# Patient Record
Sex: MALE | Race: WHITE | URBAN - METROPOLITAN AREA
[De-identification: names, ages, dates, MRNs, and addresses within clinical notes are randomized per-mention and may not be internally consistent; named-entity substitution may affect disease eponyms.]

---

## 2018-04-21 ENCOUNTER — TELEPHONE (OUTPATIENT)
Dept: OTHER | Facility: CLINIC | Age: 33
End: 2018-04-21

## 2021-03-05 ENCOUNTER — IMMUNIZATION (OUTPATIENT)
Dept: PRIMARY CARE CLINIC | Age: 36
End: 2021-03-05
Payer: COMMERCIAL

## 2021-03-05 PROCEDURE — 91300 COVID-19, PFIZER VACCINE 30MCG/0.3ML DOSE: CPT | Performed by: FAMILY MEDICINE

## 2021-03-05 PROCEDURE — 0001A COVID-19, PFIZER VACCINE 30MCG/0.3ML DOSE: CPT | Performed by: FAMILY MEDICINE

## 2021-03-26 ENCOUNTER — IMMUNIZATION (OUTPATIENT)
Dept: PRIMARY CARE CLINIC | Age: 36
End: 2021-03-26
Payer: COMMERCIAL

## 2021-03-26 PROCEDURE — 91300 COVID-19, PFIZER VACCINE 30MCG/0.3ML DOSE: CPT | Performed by: FAMILY MEDICINE

## 2021-03-26 PROCEDURE — 0002A COVID-19, PFIZER VACCINE 30MCG/0.3ML DOSE: CPT | Performed by: FAMILY MEDICINE

## 2023-09-30 ENCOUNTER — HOSPITAL ENCOUNTER (OUTPATIENT)
Age: 38
Setting detail: OBSERVATION
Discharge: ANOTHER HOSPITAL | End: 2023-10-02
Attending: EMERGENCY MEDICINE | Admitting: INTERNAL MEDICINE
Payer: COMMERCIAL

## 2023-09-30 ENCOUNTER — APPOINTMENT (OUTPATIENT)
Dept: CT IMAGING | Age: 38
End: 2023-09-30
Payer: COMMERCIAL

## 2023-09-30 DIAGNOSIS — Z86.59 HISTORY OF ANXIETY: ICD-10-CM

## 2023-09-30 DIAGNOSIS — R47.9 DIFFICULTY SPEAKING: ICD-10-CM

## 2023-09-30 DIAGNOSIS — R41.82 ALTERED MENTAL STATUS, UNSPECIFIED ALTERED MENTAL STATUS TYPE: Primary | ICD-10-CM

## 2023-09-30 DIAGNOSIS — H53.2 DOUBLE VISION: ICD-10-CM

## 2023-09-30 PROBLEM — R41.0 CONFUSION: Status: ACTIVE | Noted: 2023-09-30

## 2023-09-30 LAB
ALBUMIN SERPL-MCNC: 4.9 G/DL (ref 3.4–5)
ALBUMIN/GLOB SERPL: 1.8 {RATIO} (ref 1.1–2.2)
ALP SERPL-CCNC: 106 U/L (ref 40–129)
ALT SERPL-CCNC: 27 U/L (ref 10–40)
AMMONIA PLAS-SCNC: 39 UMOL/L (ref 16–60)
AMPHETAMINES UR QL SCN>1000 NG/ML: NORMAL
ANION GAP SERPL CALCULATED.3IONS-SCNC: 9 MMOL/L (ref 3–16)
AST SERPL-CCNC: 21 U/L (ref 15–37)
BARBITURATES UR QL SCN>200 NG/ML: NORMAL
BASE EXCESS BLDV CALC-SCNC: -0.5 MMOL/L (ref -3–3)
BASOPHILS # BLD: 0 K/UL (ref 0–0.2)
BASOPHILS NFR BLD: 0.2 %
BENZODIAZ UR QL SCN>200 NG/ML: NORMAL
BILIRUB SERPL-MCNC: 0.5 MG/DL (ref 0–1)
BILIRUB UR QL STRIP.AUTO: NEGATIVE
BUN SERPL-MCNC: 15 MG/DL (ref 7–20)
CALCIUM SERPL-MCNC: 9.5 MG/DL (ref 8.3–10.6)
CANNABINOIDS UR QL SCN>50 NG/ML: NORMAL
CHLORIDE SERPL-SCNC: 101 MMOL/L (ref 99–110)
CLARITY UR: CLEAR
CO2 BLDV-SCNC: 26 MMOL/L
CO2 SERPL-SCNC: 27 MMOL/L (ref 21–32)
COCAINE UR QL SCN: NORMAL
COHGB MFR BLDV: 1.3 % (ref 0–1.5)
COLOR UR: YELLOW
CREAT SERPL-MCNC: 0.8 MG/DL (ref 0.9–1.3)
DEPRECATED RDW RBC AUTO: 14.5 % (ref 12.4–15.4)
DRUG SCREEN COMMENT UR-IMP: NORMAL
EKG ATRIAL RATE: 105 BPM
EKG DIAGNOSIS: NORMAL
EKG P AXIS: 59 DEGREES
EKG P-R INTERVAL: 156 MS
EKG Q-T INTERVAL: 348 MS
EKG QRS DURATION: 84 MS
EKG QTC CALCULATION (BAZETT): 459 MS
EKG R AXIS: 32 DEGREES
EKG T AXIS: 21 DEGREES
EKG VENTRICULAR RATE: 105 BPM
EOSINOPHIL # BLD: 0 K/UL (ref 0–0.6)
EOSINOPHIL NFR BLD: 0.4 %
ETHANOLAMINE SERPL-MCNC: NORMAL MG/DL (ref 0–0.08)
FENTANYL SCREEN, URINE: NORMAL
GFR SERPLBLD CREATININE-BSD FMLA CKD-EPI: >60 ML/MIN/{1.73_M2}
GLUCOSE SERPL-MCNC: 117 MG/DL (ref 70–99)
GLUCOSE UR STRIP.AUTO-MCNC: NEGATIVE MG/DL
HCO3 BLDV-SCNC: 24.6 MMOL/L (ref 23–29)
HCT VFR BLD AUTO: 43 % (ref 40.5–52.5)
HGB BLD-MCNC: 14.7 G/DL (ref 13.5–17.5)
HGB UR QL STRIP.AUTO: NEGATIVE
KETONES UR STRIP.AUTO-MCNC: NEGATIVE MG/DL
LACTATE BLDV-SCNC: 1.2 MMOL/L (ref 0.4–1.9)
LEUKOCYTE ESTERASE UR QL STRIP.AUTO: NEGATIVE
LYMPHOCYTES # BLD: 1.3 K/UL (ref 1–5.1)
LYMPHOCYTES NFR BLD: 18.1 %
MCH RBC QN AUTO: 29.3 PG (ref 26–34)
MCHC RBC AUTO-ENTMCNC: 34.1 G/DL (ref 31–36)
MCV RBC AUTO: 85.9 FL (ref 80–100)
METHADONE UR QL SCN>300 NG/ML: NORMAL
METHGB MFR BLDV: 0.7 %
MONOCYTES # BLD: 0.5 K/UL (ref 0–1.3)
MONOCYTES NFR BLD: 6.9 %
NEUTROPHILS # BLD: 5.5 K/UL (ref 1.7–7.7)
NEUTROPHILS NFR BLD: 74.4 %
NITRITE UR QL STRIP.AUTO: NEGATIVE
O2 THERAPY: ABNORMAL
OPIATES UR QL SCN>300 NG/ML: NORMAL
OXYCODONE UR QL SCN: NORMAL
PCO2 BLDV: 41.8 MMHG (ref 40–50)
PCP UR QL SCN>25 NG/ML: NORMAL
PH BLDV: 7.39 [PH] (ref 7.35–7.45)
PH UR STRIP.AUTO: 7 [PH] (ref 5–8)
PH UR STRIP: 7 [PH]
PLATELET # BLD AUTO: 224 K/UL (ref 135–450)
PMV BLD AUTO: 7.4 FL (ref 5–10.5)
PO2 BLDV: 69.3 MMHG (ref 25–40)
POTASSIUM SERPL-SCNC: 3.8 MMOL/L (ref 3.5–5.1)
PROCALCITONIN SERPL IA-MCNC: 0.03 NG/ML (ref 0–0.15)
PROT SERPL-MCNC: 7.7 G/DL (ref 6.4–8.2)
PROT UR STRIP.AUTO-MCNC: NEGATIVE MG/DL
RBC # BLD AUTO: 5.01 M/UL (ref 4.2–5.9)
SALICYLATES SERPL-MCNC: 1.5 MG/DL (ref 15–30)
SAO2 % BLDV: 93 %
SODIUM SERPL-SCNC: 137 MMOL/L (ref 136–145)
SP GR UR STRIP.AUTO: 1.01 (ref 1–1.03)
UA COMPLETE W REFLEX CULTURE PNL UR: NORMAL
UA DIPSTICK W REFLEX MICRO PNL UR: NORMAL
URN SPEC COLLECT METH UR: NORMAL
UROBILINOGEN UR STRIP-ACNC: 0.2 E.U./DL
WBC # BLD AUTO: 7.4 K/UL (ref 4–11)

## 2023-09-30 PROCEDURE — 82746 ASSAY OF FOLIC ACID SERUM: CPT

## 2023-09-30 PROCEDURE — 80179 DRUG ASSAY SALICYLATE: CPT

## 2023-09-30 PROCEDURE — 85025 COMPLETE CBC W/AUTO DIFF WBC: CPT

## 2023-09-30 PROCEDURE — 82140 ASSAY OF AMMONIA: CPT

## 2023-09-30 PROCEDURE — 6370000000 HC RX 637 (ALT 250 FOR IP): Performed by: EMERGENCY MEDICINE

## 2023-09-30 PROCEDURE — 81003 URINALYSIS AUTO W/O SCOPE: CPT

## 2023-09-30 PROCEDURE — 93005 ELECTROCARDIOGRAM TRACING: CPT | Performed by: EMERGENCY MEDICINE

## 2023-09-30 PROCEDURE — G0378 HOSPITAL OBSERVATION PER HR: HCPCS

## 2023-09-30 PROCEDURE — 83605 ASSAY OF LACTIC ACID: CPT

## 2023-09-30 PROCEDURE — 82077 ASSAY SPEC XCP UR&BREATH IA: CPT

## 2023-09-30 PROCEDURE — 96374 THER/PROPH/DIAG INJ IV PUSH: CPT

## 2023-09-30 PROCEDURE — 6360000002 HC RX W HCPCS: Performed by: INTERNAL MEDICINE

## 2023-09-30 PROCEDURE — 82607 VITAMIN B-12: CPT

## 2023-09-30 PROCEDURE — 96375 TX/PRO/DX INJ NEW DRUG ADDON: CPT

## 2023-09-30 PROCEDURE — 6370000000 HC RX 637 (ALT 250 FOR IP): Performed by: NURSE PRACTITIONER

## 2023-09-30 PROCEDURE — 80307 DRUG TEST PRSMV CHEM ANLYZR: CPT

## 2023-09-30 PROCEDURE — 2580000003 HC RX 258: Performed by: EMERGENCY MEDICINE

## 2023-09-30 PROCEDURE — 82803 BLOOD GASES ANY COMBINATION: CPT

## 2023-09-30 PROCEDURE — 70450 CT HEAD/BRAIN W/O DYE: CPT

## 2023-09-30 PROCEDURE — 6360000002 HC RX W HCPCS: Performed by: EMERGENCY MEDICINE

## 2023-09-30 PROCEDURE — 84443 ASSAY THYROID STIM HORMONE: CPT

## 2023-09-30 PROCEDURE — 36415 COLL VENOUS BLD VENIPUNCTURE: CPT

## 2023-09-30 PROCEDURE — 80053 COMPREHEN METABOLIC PANEL: CPT

## 2023-09-30 PROCEDURE — 93010 ELECTROCARDIOGRAM REPORT: CPT | Performed by: INTERNAL MEDICINE

## 2023-09-30 PROCEDURE — 99285 EMERGENCY DEPT VISIT HI MDM: CPT

## 2023-09-30 PROCEDURE — 84145 PROCALCITONIN (PCT): CPT

## 2023-09-30 PROCEDURE — 2580000003 HC RX 258: Performed by: INTERNAL MEDICINE

## 2023-09-30 RX ORDER — LORAZEPAM 1 MG/1
1 TABLET ORAL ONCE
Status: COMPLETED | OUTPATIENT
Start: 2023-09-30 | End: 2023-09-30

## 2023-09-30 RX ORDER — ONDANSETRON 4 MG/1
4 TABLET, ORALLY DISINTEGRATING ORAL EVERY 8 HOURS PRN
Status: DISCONTINUED | OUTPATIENT
Start: 2023-09-30 | End: 2023-10-02 | Stop reason: HOSPADM

## 2023-09-30 RX ORDER — METOCLOPRAMIDE HYDROCHLORIDE 5 MG/ML
10 INJECTION INTRAMUSCULAR; INTRAVENOUS ONCE
Status: COMPLETED | OUTPATIENT
Start: 2023-09-30 | End: 2023-09-30

## 2023-09-30 RX ORDER — ACETAMINOPHEN 650 MG/1
650 SUPPOSITORY RECTAL EVERY 6 HOURS PRN
Status: DISCONTINUED | OUTPATIENT
Start: 2023-09-30 | End: 2023-10-02 | Stop reason: HOSPADM

## 2023-09-30 RX ORDER — SODIUM CHLORIDE 0.9 % (FLUSH) 0.9 %
5-40 SYRINGE (ML) INJECTION PRN
Status: DISCONTINUED | OUTPATIENT
Start: 2023-09-30 | End: 2023-10-02 | Stop reason: HOSPADM

## 2023-09-30 RX ORDER — QUETIAPINE FUMARATE 25 MG/1
50 TABLET, FILM COATED ORAL
Status: DISCONTINUED | OUTPATIENT
Start: 2023-09-30 | End: 2023-09-30

## 2023-09-30 RX ORDER — DIPHENHYDRAMINE HYDROCHLORIDE 50 MG/ML
50 INJECTION INTRAMUSCULAR; INTRAVENOUS ONCE
Status: COMPLETED | OUTPATIENT
Start: 2023-09-30 | End: 2023-09-30

## 2023-09-30 RX ORDER — ONDANSETRON 2 MG/ML
4 INJECTION INTRAMUSCULAR; INTRAVENOUS EVERY 6 HOURS PRN
Status: DISCONTINUED | OUTPATIENT
Start: 2023-09-30 | End: 2023-10-02 | Stop reason: HOSPADM

## 2023-09-30 RX ORDER — 0.9 % SODIUM CHLORIDE 0.9 %
1000 INTRAVENOUS SOLUTION INTRAVENOUS ONCE
Status: COMPLETED | OUTPATIENT
Start: 2023-09-30 | End: 2023-09-30

## 2023-09-30 RX ORDER — SODIUM CHLORIDE 0.9 % (FLUSH) 0.9 %
5-40 SYRINGE (ML) INJECTION EVERY 12 HOURS SCHEDULED
Status: DISCONTINUED | OUTPATIENT
Start: 2023-09-30 | End: 2023-10-02 | Stop reason: HOSPADM

## 2023-09-30 RX ORDER — BUTALBITAL, ACETAMINOPHEN AND CAFFEINE 50; 325; 40 MG/1; MG/1; MG/1
2 TABLET ORAL ONCE
Status: COMPLETED | OUTPATIENT
Start: 2023-09-30 | End: 2023-09-30

## 2023-09-30 RX ORDER — SODIUM CHLORIDE 9 MG/ML
INJECTION, SOLUTION INTRAVENOUS PRN
Status: DISCONTINUED | OUTPATIENT
Start: 2023-09-30 | End: 2023-10-02 | Stop reason: HOSPADM

## 2023-09-30 RX ORDER — CYCLOBENZAPRINE HCL 10 MG
10 TABLET ORAL NIGHTLY PRN
COMMUNITY
Start: 2023-08-28

## 2023-09-30 RX ORDER — ACETAMINOPHEN 325 MG/1
650 TABLET ORAL EVERY 6 HOURS PRN
Status: DISCONTINUED | OUTPATIENT
Start: 2023-09-30 | End: 2023-10-02 | Stop reason: HOSPADM

## 2023-09-30 RX ORDER — PRAZOSIN HYDROCHLORIDE 2 MG/1
12 CAPSULE ORAL NIGHTLY
Status: ON HOLD | COMMUNITY
Start: 2023-09-18 | End: 2023-10-01

## 2023-09-30 RX ORDER — LORAZEPAM 2 MG/ML
0.5 INJECTION INTRAMUSCULAR EVERY 6 HOURS PRN
Status: DISCONTINUED | OUTPATIENT
Start: 2023-09-30 | End: 2023-10-01

## 2023-09-30 RX ORDER — PANTOPRAZOLE SODIUM 40 MG/1
40 TABLET, DELAYED RELEASE ORAL
Status: DISCONTINUED | OUTPATIENT
Start: 2023-10-01 | End: 2023-10-02 | Stop reason: HOSPADM

## 2023-09-30 RX ORDER — KETOROLAC TROMETHAMINE 30 MG/ML
30 INJECTION, SOLUTION INTRAMUSCULAR; INTRAVENOUS ONCE
Status: COMPLETED | OUTPATIENT
Start: 2023-09-30 | End: 2023-09-30

## 2023-09-30 RX ORDER — ACETAMINOPHEN 325 MG/1
650 TABLET ORAL ONCE
Status: COMPLETED | OUTPATIENT
Start: 2023-09-30 | End: 2023-09-30

## 2023-09-30 RX ORDER — SODIUM CHLORIDE 9 MG/ML
INJECTION, SOLUTION INTRAVENOUS CONTINUOUS
Status: DISCONTINUED | OUTPATIENT
Start: 2023-09-30 | End: 2023-10-02 | Stop reason: HOSPADM

## 2023-09-30 RX ORDER — ENOXAPARIN SODIUM 100 MG/ML
40 INJECTION SUBCUTANEOUS DAILY
Status: DISCONTINUED | OUTPATIENT
Start: 2023-10-01 | End: 2023-10-02 | Stop reason: HOSPADM

## 2023-09-30 RX ORDER — POLYETHYLENE GLYCOL 3350 17 G/17G
17 POWDER, FOR SOLUTION ORAL DAILY PRN
Status: DISCONTINUED | OUTPATIENT
Start: 2023-09-30 | End: 2023-10-02 | Stop reason: HOSPADM

## 2023-09-30 RX ORDER — ATOMOXETINE 60 MG/1
60 CAPSULE ORAL DAILY
COMMUNITY
Start: 2023-08-14

## 2023-09-30 RX ORDER — LORAZEPAM 1 MG/1
1 TABLET ORAL EVERY 12 HOURS PRN
COMMUNITY
Start: 2023-09-29

## 2023-09-30 RX ORDER — SERTRALINE HYDROCHLORIDE 100 MG/1
200 TABLET, FILM COATED ORAL DAILY
COMMUNITY

## 2023-09-30 RX ADMIN — SODIUM CHLORIDE: 9 INJECTION, SOLUTION INTRAVENOUS at 18:38

## 2023-09-30 RX ADMIN — LORAZEPAM 0.5 MG: 2 INJECTION INTRAMUSCULAR; INTRAVENOUS at 21:13

## 2023-09-30 RX ADMIN — DIPHENHYDRAMINE HYDROCHLORIDE 50 MG: 50 INJECTION INTRAMUSCULAR; INTRAVENOUS at 17:36

## 2023-09-30 RX ADMIN — BUTALBITAL, ACETAMINOPHEN AND CAFFEINE 2 TABLET: 325; 50; 40 TABLET ORAL at 20:23

## 2023-09-30 RX ADMIN — KETOROLAC TROMETHAMINE 30 MG: 30 INJECTION, SOLUTION INTRAMUSCULAR; INTRAVENOUS at 17:36

## 2023-09-30 RX ADMIN — SODIUM CHLORIDE 1000 ML: 9 INJECTION, SOLUTION INTRAVENOUS at 13:17

## 2023-09-30 RX ADMIN — LORAZEPAM 1 MG: 1 TABLET ORAL at 15:11

## 2023-09-30 RX ADMIN — METOCLOPRAMIDE HYDROCHLORIDE 10 MG: 5 INJECTION INTRAMUSCULAR; INTRAVENOUS at 17:36

## 2023-09-30 RX ADMIN — ACETAMINOPHEN 650 MG: 325 TABLET ORAL at 13:17

## 2023-09-30 ASSESSMENT — PAIN SCALES - GENERAL
PAINLEVEL_OUTOF10: 5
PAINLEVEL_OUTOF10: 4
PAINLEVEL_OUTOF10: 0

## 2023-09-30 ASSESSMENT — PAIN - FUNCTIONAL ASSESSMENT: PAIN_FUNCTIONAL_ASSESSMENT: 0-10

## 2023-09-30 NOTE — ED PROVIDER NOTES
[address] [place] [address]  ED  EMERGENCY DEPARTMENT ENCOUNTER        Pt Name: Phil Hughes  MRN: [id number]  [address] [facility] [place] 1985  Date of evaluation: 9/30/2023  Provider: LINNEA De Leon - ANNE  PCP: Heriberto Alvarez  Note Started: 3:55 PM EDT 9/30/23       I have seen and evaluated this patient with my supervising physician Dr. Arthur Garcia. CHIEF COMPLAINT       Chief Complaint   Patient presents with    Altered Mental Status     With double vision when he woke up 0700, pt having some notification for high heart rate on his watch, no cardiac history, unsure of when confusion started last night, per wife \"he fell asleep hard\" [facility] [facility] took 2mg of ativan last night for OCD, ibuprofen for headache       HISTORY OF PRESENT ILLNESS: 1 or more Elements     History From: the patient and wife  Limitations to history : None    Phil Hughes is a 30 y.o. male who presents to the emergency room today with complaints of diplopia, altered mental status. Patient states that last evening he was having some issues with confusion, states that he took a nap at around 730 he woke up when his kids went to bed to go sing to them like he normally does however he states that his wife found him just laying there not doing anything, he states that he then fell asleep in a different bed in the house in a spare bedroom, states that this morning he was having continual confusion and also was having some difficulty speaking. Patient denies any tingling numbness or unilateral symptoms. He denies any trauma, does have a history of severe anxiety/PTSD. Nursing Notes were all reviewed and agreed with or any disagreements were addressed in the HPI. REVIEW OF SYSTEMS :      Review of Systems    Positives and Pertinent negatives as per HPI.      SURGICAL HISTORY     Past Surgical History:   Procedure Laterality Date    CHOLECYSTECTOMY         CURRENTMEDICATIONS       Previous Medications    OMEPRAZOLE (PRILOSEC) 20 MG CAPSULE

## 2023-09-30 NOTE — ED NOTES
ED TO INPATIENT SBAR HANDOFF    Patient Name: Juaquin Esparza   :  1985  30 y.o. MRN:  [id number]  ED Room #:    Family/Caregiver Present yes   Restraints no   Sitter no   Sepsis Risk Score Sepsis Risk Score: 0.4    Situation  Code Status: No Order No additional code details. Allergies: Sulfa antibiotics and Bentyl [dicyclomine hcl]  Weight: Patient Vitals for the past 96 hrs (Last 3 readings):   Weight   23 1229 199 lb 11.2 oz (90.6 kg)     Arrived from: home  Chief Complaint:   Chief Complaint   Patient presents with    Altered Mental Status     With double vision when he woke up 0700, pt having some notification for high heart rate on his watch, no cardiac history, unsure of when confusion started last night, per wife \"he fell asleep hard\" [address] [place] [address] took 2mg of ativan last night for OCD, ibuprofen for headache     Hospital Problem/Diagnosis:  Principal Problem:    Confusion  Resolved Problems:    * No resolved hospital problems. *    Imaging:   CT HEAD WO CONTRAST   Final Result   No acute intracranial abnormality. Visualized portion of the right maxillary sinus is entirely opacified.            Abnormal labs:   Abnormal Labs Reviewed   COMPREHENSIVE METABOLIC PANEL W/ REFLEX TO MG FOR LOW K - Abnormal; Notable for the following components:       Result Value    Glucose 117 (*)     Creatinine 0.8 (*)     All other components within normal limits   BLOOD GAS, VENOUS - Abnormal; Notable for the following components:    pO2, Gustavo 69.3 (*)     All other components within normal limits   SALICYLATE LEVEL - Abnormal; Notable for the following components:    Salicylate, Serum 1.5 (*)     All other components within normal limits     Critical values: no       Background  History:   Past Medical History:   Diagnosis Date    Anxiety     OCD (obsessive compulsive disorder)        Assessment    Vitals/MEWS:    Level of Consciousness: Alert (0)   Vitals:    23 1302 23 1414 23 1502

## 2023-09-30 NOTE — ED PROVIDER NOTES
Emergency Department Attending Provider Note  Location: [facility]  ED  9/30/2023     Chief Complaint   Patient presents with    Altered Mental Status     With double vision when he woke up 0700, pt having some notification for high heart rate on his watch, no cardiac history, unsure of when confusion started last night, per wife \"he fell asleep hard\" 1077 [facility] took 2mg of ativan last night for OCD, ibuprofen for headache        PATIENT ID:  Mary Jaramillo is a 30 y.o. male    Past Medical History:   Diagnosis Date    Anxiety     OCD (obsessive compulsive disorder)        Mary Jaramlilo was evaluated in the Emergency Department for onset of double vision for the past couple of days, but what really concerned them was confusion and difficulty finding words that started last night. For example, patient was having a hard time discussing putting their daughter to bed last night. This morning, his Apple Watch had noticed that his heart rate was high, and his he had told his wife his \"high rope heart,\" which did not make sense. He seemed a little confused this morning, and actually woke up in the wrong bed in their home. Patient has not had any new medication changes, and although he is on multiple psychiatric medications, they actually called his psychiatrist this morning, and the psychiatrist does not feel it was medication related. Patient denies any known tick exposure. He works as an , and says his work life is \"horrible,\" and does not think this is anxiety related, because he has been in much worse stressful situations in the past.  Otherwise, denies any chest pain, shortness of breath. No ill symptomatology. No headache or decreased range of motion of the neck. Denies any numbness, weakness, tingling in his legs or extremities. No falls. Does not have any balance issues right now.     Chronically; left pupil slightly smaller than right     Level of Consciousness:  0 - alert;

## 2023-09-30 NOTE — ED PROVIDER NOTES
The Ekg interpreted by me shows  sinus tachycardia, khni=198  Axis is   Normal  QTc is  normal  Intervals and Durations are unremarkable.       ST Segments: normal  No previous available for comparison         Sushil Brewer MD  09/30/23 1037

## 2023-09-30 NOTE — H&P
V2.0  History and Physical      Name:  Chris Vernon /Age/Sex: 1985  (30 y.o. male)   MRN & CSN:  [id number] & [id number] Encounter Date/Time: 2023 4:14 PM EDT   Location:   PCP: Magdy Warren Day: 1    Assessment and Plan:   Chris Vernon is a 30 y.o. male with a Significant PMH as below who presented to ED with c/o confusion      Plan:    Confusion. Double vision. No other focal neurological deficits. Headache. CT head is negative. CBC and BMP negative. Unsure of the etiology. Sinus tachycardia. We will keep on IV hydration. Will get procalcitonin, UA, we will keep on IV hydration. Ammonia, folate, B12, TSH ordered. Given headache and double vision, MRI ordered. Continue with home medications. Disposition:   ECF/home/home health care in 2 to 3 days depending on clinical course    Diet No diet orders on file   DVT Prophylaxis Lovenox/SCDs    Code Status No Order         Cindy in detail with ER physician recommending admission. CT head reviewed and discussed with patient and family at bedside. History from:     patient, spouse    History of Present Illness:     Chief Complaint: Confusion  Chris Vernon is a 30 y.o. male with significant past medical history of psych disorder presented to the hospital with confusion. Patient was in his usual state of health as of last night, last night during bedtime he was not able to follow his back night routine and was kind of sleepwalking and then dozed off on the couch and was difficult to wake up even with vigorous shaking as per the wife, denies doing any drugs, no alcohol or any other drugs. This morning he did not remember anything and was confused about the details. Admits of having double vision however denies having any other neurological deficits, admits of having headache as well. Did get flu shot yesterday    Work-up in the ER including CT head, CBC, BMP has been negative.      Review of Systems:

## 2023-09-30 NOTE — ED NOTES
Pt resting quietly in bed. Wife at bedside. Waiting on results, no needs voiced.      Vic Robertson RN  09/30/23 3317

## 2023-10-01 ENCOUNTER — APPOINTMENT (OUTPATIENT)
Dept: MRI IMAGING | Age: 38
End: 2023-10-01
Payer: COMMERCIAL

## 2023-10-01 VITALS
OXYGEN SATURATION: 96 % | BODY MASS INDEX: 25.84 KG/M2 | SYSTOLIC BLOOD PRESSURE: 143 MMHG | HEART RATE: 68 BPM | WEIGHT: 195 LBS | HEIGHT: 73 IN | RESPIRATION RATE: 17 BRPM | TEMPERATURE: 98.5 F | DIASTOLIC BLOOD PRESSURE: 92 MMHG

## 2023-10-01 LAB
FOLATE SERPL-MCNC: 15.9 NG/ML (ref 4.78–24.2)
MAGNESIUM SERPL-MCNC: 2.2 MG/DL (ref 1.8–2.4)
PHOSPHATE SERPL-MCNC: 3.7 MG/DL (ref 2.5–4.9)
TSH SERPL DL<=0.005 MIU/L-ACNC: 1.72 UIU/ML (ref 0.27–4.2)
VIT B12 SERPL-MCNC: 514 PG/ML (ref 211–911)

## 2023-10-01 PROCEDURE — 96372 THER/PROPH/DIAG INJ SC/IM: CPT

## 2023-10-01 PROCEDURE — 6360000002 HC RX W HCPCS: Performed by: HOSPITALIST

## 2023-10-01 PROCEDURE — 96376 TX/PRO/DX INJ SAME DRUG ADON: CPT

## 2023-10-01 PROCEDURE — 6360000002 HC RX W HCPCS: Performed by: NURSE PRACTITIONER

## 2023-10-01 PROCEDURE — 36415 COLL VENOUS BLD VENIPUNCTURE: CPT

## 2023-10-01 PROCEDURE — 6360000002 HC RX W HCPCS: Performed by: INTERNAL MEDICINE

## 2023-10-01 PROCEDURE — G0378 HOSPITAL OBSERVATION PER HR: HCPCS

## 2023-10-01 PROCEDURE — 83735 ASSAY OF MAGNESIUM: CPT

## 2023-10-01 PROCEDURE — 6370000000 HC RX 637 (ALT 250 FOR IP): Performed by: HOSPITALIST

## 2023-10-01 PROCEDURE — 2580000003 HC RX 258: Performed by: INTERNAL MEDICINE

## 2023-10-01 PROCEDURE — 6370000000 HC RX 637 (ALT 250 FOR IP): Performed by: INTERNAL MEDICINE

## 2023-10-01 PROCEDURE — 70551 MRI BRAIN STEM W/O DYE: CPT

## 2023-10-01 PROCEDURE — 6370000000 HC RX 637 (ALT 250 FOR IP): Performed by: NURSE PRACTITIONER

## 2023-10-01 PROCEDURE — 84100 ASSAY OF PHOSPHORUS: CPT

## 2023-10-01 RX ORDER — KETOROLAC TROMETHAMINE 30 MG/ML
15 INJECTION, SOLUTION INTRAMUSCULAR; INTRAVENOUS ONCE
Status: COMPLETED | OUTPATIENT
Start: 2023-10-01 | End: 2023-10-01

## 2023-10-01 RX ORDER — LORAZEPAM 2 MG/ML
0.5 INJECTION INTRAMUSCULAR EVERY 4 HOURS PRN
Status: DISCONTINUED | OUTPATIENT
Start: 2023-10-01 | End: 2023-10-02 | Stop reason: HOSPADM

## 2023-10-01 RX ORDER — PRAZOSIN HYDROCHLORIDE 5 MG/1
12 CAPSULE ORAL NIGHTLY
COMMUNITY

## 2023-10-01 RX ORDER — DIPHENHYDRAMINE HYDROCHLORIDE 50 MG/ML
25 INJECTION INTRAMUSCULAR; INTRAVENOUS ONCE
Status: COMPLETED | OUTPATIENT
Start: 2023-10-01 | End: 2023-10-01

## 2023-10-01 RX ORDER — CYCLOBENZAPRINE HCL 10 MG
10 TABLET ORAL 3 TIMES DAILY PRN
Status: DISCONTINUED | OUTPATIENT
Start: 2023-10-01 | End: 2023-10-02 | Stop reason: HOSPADM

## 2023-10-01 RX ORDER — ATOMOXETINE 60 MG/1
60 CAPSULE ORAL DAILY
Status: DISCONTINUED | OUTPATIENT
Start: 2023-10-01 | End: 2023-10-02 | Stop reason: HOSPADM

## 2023-10-01 RX ADMIN — DIPHENHYDRAMINE HYDROCHLORIDE 25 MG: 50 INJECTION, SOLUTION INTRAMUSCULAR; INTRAVENOUS at 02:10

## 2023-10-01 RX ADMIN — LORAZEPAM 0.5 MG: 2 INJECTION INTRAMUSCULAR; INTRAVENOUS at 03:20

## 2023-10-01 RX ADMIN — LORAZEPAM 0.5 MG: 2 INJECTION INTRAMUSCULAR; INTRAVENOUS at 09:24

## 2023-10-01 RX ADMIN — ENOXAPARIN SODIUM 40 MG: 100 INJECTION SUBCUTANEOUS at 09:00

## 2023-10-01 RX ADMIN — KETOROLAC TROMETHAMINE 15 MG: 30 INJECTION, SOLUTION INTRAMUSCULAR; INTRAVENOUS at 02:10

## 2023-10-01 RX ADMIN — SODIUM CHLORIDE: 9 INJECTION, SOLUTION INTRAVENOUS at 03:21

## 2023-10-01 RX ADMIN — Medication 10 ML: at 20:34

## 2023-10-01 RX ADMIN — PRAZOSIN HYDROCHLORIDE 12 MG: 5 CAPSULE ORAL at 20:30

## 2023-10-01 RX ADMIN — PANTOPRAZOLE SODIUM 40 MG: 40 TABLET, DELAYED RELEASE ORAL at 09:00

## 2023-10-01 RX ADMIN — DIPHENHYDRAMINE HYDROCHLORIDE 25 MG: 50 INJECTION, SOLUTION INTRAMUSCULAR; INTRAVENOUS at 20:33

## 2023-10-01 RX ADMIN — CYCLOBENZAPRINE 10 MG: 10 TABLET, FILM COATED ORAL at 19:40

## 2023-10-01 RX ADMIN — LORAZEPAM 0.5 MG: 2 INJECTION INTRAMUSCULAR; INTRAVENOUS at 20:31

## 2023-10-01 RX ADMIN — SODIUM CHLORIDE: 9 INJECTION, SOLUTION INTRAVENOUS at 14:39

## 2023-10-01 RX ADMIN — CYCLOBENZAPRINE 10 MG: 10 TABLET, FILM COATED ORAL at 04:35

## 2023-10-01 RX ADMIN — ACETAMINOPHEN 650 MG: 325 TABLET ORAL at 11:02

## 2023-10-01 RX ADMIN — LORAZEPAM 0.5 MG: 2 INJECTION INTRAMUSCULAR; INTRAVENOUS at 17:31

## 2023-10-01 RX ADMIN — KETOROLAC TROMETHAMINE 15 MG: 30 INJECTION, SOLUTION INTRAMUSCULAR; INTRAVENOUS at 20:33

## 2023-10-01 RX ADMIN — SERTRALINE 200 MG: 50 TABLET, FILM COATED ORAL at 11:55

## 2023-10-01 RX ADMIN — LORAZEPAM 0.5 MG: 2 INJECTION INTRAMUSCULAR; INTRAVENOUS at 13:28

## 2023-10-01 ASSESSMENT — PAIN SCALES - GENERAL
PAINLEVEL_OUTOF10: 10
PAINLEVEL_OUTOF10: 5

## 2023-10-01 NOTE — PROGRESS NOTES
Pt family asked to see Gladys Castano. Writer spoke to patient family and they are very frustrated at how long the transport process to  has taken and would like to sign out AMA. Writer then asked if they would like me to call the transfer center or sign out AMA. Writer called the transfer center to get a status update on pt being transferred. Per Ami at the transfer center [facility] has not cleaned a bed yet for pt to transfer. Writer then informed pt and family that we are waiting for [facility] to clean a bed before transport can be initiated. Writer also informed pt and family unsure of how long it can take for bed clean to be completed.

## 2023-10-01 NOTE — PLAN OF CARE
Problem: Discharge Planning  Goal: Discharge to home or other facility with appropriate resources  Outcome: Progressing     Problem: Pain  Goal: Verbalizes/displays adequate comfort level or baseline comfort level  10/1/2023 1349 by Marco Rao RN  Outcome: Progressing

## 2023-10-01 NOTE — PROGRESS NOTES
Pts wife Flavio Vasques spoke to writer and asked if pt could be D/C once MRI resulted. Writer PS Dr. Nicole Aquino [phone] to ask if pt could D/C. Dr. Nicole Aquino did not want to D/C pt at this time.

## 2023-10-01 NOTE — PROGRESS NOTES
0853:Writer Ps Dr. Ana Lilia Peters: Medication list updated please fix listed medications Atomoxetine 60mg daily, Zoloft 200mg daily, Prazosin 12mg nightly    0853: No new orders    1032 Writer PS Dr. Ana Lilia Peters Pt is very anxious ativan last given at [phone] and is q6. Pt also complaining of a headache due to anxiety. He was given fioricet yesterday. If pt can have again for a headache, please order. Thanks! Response 1032 will look into it. [address] Dr. Ana Lilia Peters Pt and family requesting pt be transferred to [facility] hospital. Wife stating they have connections to the Neuro department down there    1200 Response: I will try to reach out to them when I get a chance. 1255: Writer PS Dr. Ana Lilia Peters: Pt and family very frustrated and wanting you to come back up to the bedside. They dont understand why a release form cannot be signed and the pt be transported to . I asked the wife to get in contact with who she knows from the neuro deparment to try and expedite the process with the transfer center. Family is also stating the doctors at [facility] are waiting for the patient. 1257: Writer spoke to Dr. Ana Lilia Peters via phone. Writer went into pt room and asked if there was a specific Neurologist for Dr. Ana Lilia Peters to call and speak to at [facility]. Wife unsure of Neurologist name at this time    [phone] Pt Wife Zina Grande hit call light and told writer that she is unsure of the doctor, but to let Kendrick Houge know to contact the stroke team.    [phone]: Writer PS Dr. Ana Lilia Peters They are having a difficult time finding an exact doctor.  Wife stated whoever is on call for the stroke team.    Response 1316: Let me talk to [facility]

## 2023-10-01 NOTE — PROGRESS NOTES
Writer called transfer center on update for pt being transported to . Writer spoke to Denny who contacted . Bed still not available and needs to be cleaned.

## 2023-10-01 NOTE — DISCHARGE SUMMARY
V2.0  Discharge Summary    Name:  Anita Lozada /Age/Sex: 1985 (30 y.o. male)   Admit Date: 2023  Discharge Date: 10/1/23    MRN & CSN:  [id number] & [id number] Encounter Date and Time 10/1/23 2:59 PM EDT    Attending:  Kath Loredo MD Discharging Provider: Kath Loredo MD       Hospital Course:     Patient is a 30-year-old male with past medical history of PTSD, severe anxiety, multiple psych medications who presented to the ED with confusion and diplopia. Patient had a CT which did not show any acute intracranial bleed, space-occupying lesion or stroke. Patient recently admitted for further evaluation. During my evaluation, patient's MRI is pending. Continues to have diplopia. Patient and his family have stated that they want the patient to be transferred to [facility]. On rounds today, I have stated to them that we will have neurology and potentially psychiatry come and evaluate the patient. Patient was discussed with  per family wishes. They have accepted the patient and patient will be transferred to . Discharge Instruction:     Primary care physician: Victoria Owusu within 2 weeks  Diet: regular diet   Activity: activity as tolerated  Disposition: Discharged to: Accepted to and will be Dced to   Condition on discharge: Stable    Discharge Medications:        Medication List        STOP taking these medications      SEROquel 50 MG tablet  Generic drug: QUEtiapine            ASK your doctor about these medications      atomoxetine 60 MG capsule  Commonly known as: STRATTERA     cyclobenzaprine 10 MG tablet  Commonly known as: FLEXERIL     LORazepam 1 MG tablet  Commonly known as: ATIVAN     omeprazole 20 MG delayed release capsule  Commonly known as: PRILOSEC     prazosin 5 MG capsule  Commonly known as: MINIPRESS  Ask about: Which instructions should I use?     sertraline 100 MG tablet  Commonly known as: ZOLOFT  Ask about: Which instructions should I use?              Objective

## 2023-10-02 PROCEDURE — G0378 HOSPITAL OBSERVATION PER HR: HCPCS

## 2023-10-02 NOTE — PROGRESS NOTES
Bedside report given with Camila Miranda RN. RN's spoke with pt, pts father and another visitor at bedside. Pt was given pain medication and requested more medication for CO of a headache. Primary RN to reach out to covering doctor for this. Pt and family also requesting updates on the patients bed status over at [facility], primary RN to call.

## 2023-10-02 NOTE — PROGRESS NOTES
RN called bed transfer center for update. No bed is assigned to the patient currently and RN was told that the unit is full at [facility] and they do not expect the patient to receive a bed tonight.

## 2023-10-02 NOTE — PROGRESS NOTES
PERLA russell served covering hospitalists for PRN headache medication for the patient. Orders placed.

## 2023-10-02 NOTE — PROGRESS NOTES
RN called report to [facility] nurse. Currently unavailable to take report. RN called back @ 0040, report given.